# Patient Record
Sex: FEMALE | Race: WHITE
[De-identification: names, ages, dates, MRNs, and addresses within clinical notes are randomized per-mention and may not be internally consistent; named-entity substitution may affect disease eponyms.]

---

## 2019-07-16 ENCOUNTER — HOSPITAL ENCOUNTER (INPATIENT)
Dept: HOSPITAL 75 - LDRP | Age: 24
LOS: 3 days | Discharge: HOME | End: 2019-07-19
Attending: FAMILY MEDICINE | Admitting: FAMILY MEDICINE
Payer: COMMERCIAL

## 2019-07-16 VITALS — WEIGHT: 223.38 LBS | HEIGHT: 61 IN | BODY MASS INDEX: 42.17 KG/M2

## 2019-07-16 VITALS — DIASTOLIC BLOOD PRESSURE: 83 MMHG | SYSTOLIC BLOOD PRESSURE: 128 MMHG

## 2019-07-16 VITALS — SYSTOLIC BLOOD PRESSURE: 155 MMHG | DIASTOLIC BLOOD PRESSURE: 74 MMHG

## 2019-07-16 VITALS — DIASTOLIC BLOOD PRESSURE: 69 MMHG | SYSTOLIC BLOOD PRESSURE: 143 MMHG

## 2019-07-16 VITALS — SYSTOLIC BLOOD PRESSURE: 134 MMHG | DIASTOLIC BLOOD PRESSURE: 79 MMHG

## 2019-07-16 VITALS — DIASTOLIC BLOOD PRESSURE: 79 MMHG | SYSTOLIC BLOOD PRESSURE: 121 MMHG

## 2019-07-16 VITALS — DIASTOLIC BLOOD PRESSURE: 78 MMHG | SYSTOLIC BLOOD PRESSURE: 126 MMHG

## 2019-07-16 VITALS — DIASTOLIC BLOOD PRESSURE: 82 MMHG | SYSTOLIC BLOOD PRESSURE: 136 MMHG

## 2019-07-16 VITALS — DIASTOLIC BLOOD PRESSURE: 84 MMHG | SYSTOLIC BLOOD PRESSURE: 134 MMHG

## 2019-07-16 DIAGNOSIS — Z3A.39: ICD-10-CM

## 2019-07-16 LAB
APTT PPP: YELLOW S
BACTERIA #/AREA URNS HPF: (no result) /HPF
BASOPHILS # BLD AUTO: 0 10^3/UL (ref 0–0.1)
BASOPHILS NFR BLD AUTO: 0 % (ref 0–10)
BILIRUB UR QL STRIP: NEGATIVE
EOSINOPHIL # BLD AUTO: 0.1 10^3/UL (ref 0–0.3)
EOSINOPHIL NFR BLD AUTO: 1 % (ref 0–10)
ERYTHROCYTE [DISTWIDTH] IN BLOOD BY AUTOMATED COUNT: 16.9 % (ref 10–14.5)
FIBRINOGEN PPP-MCNC: (no result) MG/DL
GLUCOSE UR STRIP-MCNC: NEGATIVE MG/DL
HCT VFR BLD CALC: 35 % (ref 35–52)
HGB BLD-MCNC: 11.1 G/DL (ref 11.5–16)
KETONES UR QL STRIP: NEGATIVE
LEUKOCYTE ESTERASE UR QL STRIP: NEGATIVE
LYMPHOCYTES # BLD AUTO: 2.5 X 10^3 (ref 1–4)
LYMPHOCYTES NFR BLD AUTO: 24 % (ref 12–44)
MANUAL DIFFERENTIAL PERFORMED BLD QL: NO
MCH RBC QN AUTO: 25 PG (ref 25–34)
MCHC RBC AUTO-ENTMCNC: 31 G/DL (ref 32–36)
MCV RBC AUTO: 81 FL (ref 80–99)
MONOCYTES # BLD AUTO: 0.8 X 10^3 (ref 0–1)
MONOCYTES NFR BLD AUTO: 7 % (ref 0–12)
NEUTROPHILS # BLD AUTO: 6.9 X 10^3 (ref 1.8–7.8)
NEUTROPHILS NFR BLD AUTO: 67 % (ref 42–75)
NITRITE UR QL STRIP: NEGATIVE
PH UR STRIP: 6 [PH] (ref 5–9)
PLATELET # BLD: 192 10^3/UL (ref 130–400)
PMV BLD AUTO: 13 FL (ref 7.4–10.4)
PROT UR QL STRIP: (no result)
RBC #/AREA URNS HPF: (no result) /HPF
SP GR UR STRIP: 1.02 (ref 1.02–1.02)
UROBILINOGEN UR-MCNC: 1 MG/DL
WBC # BLD AUTO: 10.3 10^3/UL (ref 4.3–11)
WBC #/AREA URNS HPF: (no result) /HPF

## 2019-07-16 PROCEDURE — 81000 URINALYSIS NONAUTO W/SCOPE: CPT

## 2019-07-16 PROCEDURE — 86850 RBC ANTIBODY SCREEN: CPT

## 2019-07-16 PROCEDURE — 36415 COLL VENOUS BLD VENIPUNCTURE: CPT

## 2019-07-16 PROCEDURE — 86900 BLOOD TYPING SEROLOGIC ABO: CPT

## 2019-07-16 PROCEDURE — 86901 BLOOD TYPING SEROLOGIC RH(D): CPT

## 2019-07-16 PROCEDURE — 85025 COMPLETE CBC W/AUTO DIFF WBC: CPT

## 2019-07-16 PROCEDURE — 87088 URINE BACTERIA CULTURE: CPT

## 2019-07-16 RX ADMIN — SODIUM CHLORIDE, SODIUM LACTATE, POTASSIUM CHLORIDE, CALCIUM CHLORIDE, AND DEXTROSE MONOHYDRATE SCH MLS/HR: 600; 310; 30; 20; 5 INJECTION, SOLUTION INTRAVENOUS at 20:42

## 2019-07-16 RX ADMIN — MISOPROSTOL SCH MCG: 100 TABLET ORAL at 20:42

## 2019-07-16 NOTE — NUR
CHAKA IBANEZ presented to unit via AMBULATORY from HOME, accompanied by S/O AND THIS RN 
FOR INDUCTION OF LABOR. CHAKA IBANEZ weighed, gowned, voided, and to bed.  EFHM and 
TOCO applied, VS taken.  CHAKA IBANEZ oriented to bed controls, call light, TV, heat, 
and A/C controls.

## 2019-07-16 NOTE — NUR
Telephone orders obtained from Dr Ross, repeat cytotec in 4 hours if no change in cervix, 
Start pitocin in am per protocol.

## 2019-07-16 NOTE — XMS REPORT
Continuity of Care Document

                             Created on: 2019



Jen Keenan

External Reference #: [id number]

: 1995

Sex: Female



Demographics







                          Address                   [address] 

[place]  [address]

 

                          Home Phone                [phone] x

 

                          Preferred Language        Unknown

 

                          Marital Status            Unknown

 

                          Christian Affiliation     Unknown

 

                          Race                      Unknown

 

                          Ethnic Group              Unknown





Author







                          Organization              Unknown

 

                          Address                   Unknown

 

                          Phone                     [phone]



              



Allergies

      



There is no data.                  



Medications

      



There is no data.                  



Problems

      



There is no data.                  



Procedures

      



There is no data.                  



Results

      





                    Test                Result              Range        









                                        GLUCOSE SRINIVAS 1 HOUR - 19 10:12         









                    GLUCOSE, POSTPRANDIAL/ 1 HOUR              124 mg/dL              See Note:        









                                        CBC - 19 10:12         









                    WHITE BLOOD CELL COUNT              9.1 Thousand/uL              3.8-10.8        

 

                    RED BLOOD CELL COUNT              4.18 Million/uL              3.80-5.10        

 

                    HEMOGLOBIN              11.3 g/dL              11.7-15.5        

 

                    HEMATOCRIT              36.2 %              35.0-45.0        

 

                    MCV                 86.6 fL              80.0-100.0        

 

                    MCH                 27.0 pg              27.0-33.0        

 

                    MCHC                31.2 g/dL              32.0-36.0        

 

                    RDW                 14.0 %              11.0-15.0        

 

                    PLATELET COUNT              203 Thousand/uL              140-400        

 

                    MPV                 12.6 fL              7.5-12.5        

 

                    ABSOLUTE NEUTROPHILS              6916 cells/uL              3271-5157        

 

                    ABSOLUTE LYMPHOCYTES              1638 cells/uL              850-3900        

 

                    ABSOLUTE MONOCYTES              400 cells/uL              200-950        

 

                    ABSOLUTE EOSINOPHILS              118 cells/uL                      

 

                    ABSOLUTE BASOPHILS              27 cells/uL              0-200        

 

                    NEUTROPHILS              76 %                NRG        

 

                    LYMPHOCYTES              18.0 %              NRG        

 

                    MONOCYTES              4.4 %               NRG        

 

                    EOSINOPHILS              1.3 %               NRG        

 

                    BASOPHILS              0.3 %               NRG        









                                        CULTURE, GROUP B STREP WITH SUSCEPTIBILITY - 19 09:58         









                    CULTURE, GROUP B STREP WITH SUSCEPTIBILITY              SEE NOTE               NRG  

      



                    



Encounters

      





                ACCT No.              Visit Date/Time              Discharge              Status      

                Pt. Type              Provider              Facility              Loc./Unit      

                                        Complaint        

 

             [id number]              07/15/2019 10:45:00                             ACT              Outpatient

                    AND[facility]OW[facility] LAC                                  [facility]     

                                                 

 

             [id number]              2019 09:30:00                                            Document

 Registration                                                                       

 

             [id number]              2019 09:15:00                                            Document

 Registration

## 2019-07-16 NOTE — NUR
Dr Roque called with report after attempting to call call Dr Ross with no answer. Dr roque 
aware of situation and orders to hold cytotec and monitor pt until am.

## 2019-07-17 VITALS — DIASTOLIC BLOOD PRESSURE: 77 MMHG | SYSTOLIC BLOOD PRESSURE: 132 MMHG

## 2019-07-17 VITALS — DIASTOLIC BLOOD PRESSURE: 72 MMHG | SYSTOLIC BLOOD PRESSURE: 130 MMHG

## 2019-07-17 VITALS — DIASTOLIC BLOOD PRESSURE: 75 MMHG | SYSTOLIC BLOOD PRESSURE: 120 MMHG

## 2019-07-17 VITALS — SYSTOLIC BLOOD PRESSURE: 144 MMHG | DIASTOLIC BLOOD PRESSURE: 69 MMHG

## 2019-07-17 VITALS — SYSTOLIC BLOOD PRESSURE: 121 MMHG | DIASTOLIC BLOOD PRESSURE: 68 MMHG

## 2019-07-17 VITALS — SYSTOLIC BLOOD PRESSURE: 130 MMHG | DIASTOLIC BLOOD PRESSURE: 77 MMHG

## 2019-07-17 VITALS — SYSTOLIC BLOOD PRESSURE: 119 MMHG | DIASTOLIC BLOOD PRESSURE: 73 MMHG

## 2019-07-17 VITALS — DIASTOLIC BLOOD PRESSURE: 78 MMHG | SYSTOLIC BLOOD PRESSURE: 126 MMHG

## 2019-07-17 VITALS — DIASTOLIC BLOOD PRESSURE: 72 MMHG | SYSTOLIC BLOOD PRESSURE: 144 MMHG

## 2019-07-17 VITALS — SYSTOLIC BLOOD PRESSURE: 120 MMHG | DIASTOLIC BLOOD PRESSURE: 84 MMHG

## 2019-07-17 VITALS — DIASTOLIC BLOOD PRESSURE: 64 MMHG | SYSTOLIC BLOOD PRESSURE: 106 MMHG

## 2019-07-17 VITALS — DIASTOLIC BLOOD PRESSURE: 77 MMHG | SYSTOLIC BLOOD PRESSURE: 125 MMHG

## 2019-07-17 VITALS — DIASTOLIC BLOOD PRESSURE: 59 MMHG | SYSTOLIC BLOOD PRESSURE: 126 MMHG

## 2019-07-17 VITALS — SYSTOLIC BLOOD PRESSURE: 139 MMHG | DIASTOLIC BLOOD PRESSURE: 75 MMHG

## 2019-07-17 VITALS — DIASTOLIC BLOOD PRESSURE: 78 MMHG | SYSTOLIC BLOOD PRESSURE: 128 MMHG

## 2019-07-17 VITALS — DIASTOLIC BLOOD PRESSURE: 76 MMHG | SYSTOLIC BLOOD PRESSURE: 120 MMHG

## 2019-07-17 VITALS — DIASTOLIC BLOOD PRESSURE: 75 MMHG | SYSTOLIC BLOOD PRESSURE: 122 MMHG

## 2019-07-17 VITALS — SYSTOLIC BLOOD PRESSURE: 114 MMHG | DIASTOLIC BLOOD PRESSURE: 76 MMHG

## 2019-07-17 VITALS — SYSTOLIC BLOOD PRESSURE: 120 MMHG | DIASTOLIC BLOOD PRESSURE: 71 MMHG

## 2019-07-17 VITALS — SYSTOLIC BLOOD PRESSURE: 117 MMHG | DIASTOLIC BLOOD PRESSURE: 66 MMHG

## 2019-07-17 VITALS — SYSTOLIC BLOOD PRESSURE: 120 MMHG | DIASTOLIC BLOOD PRESSURE: 77 MMHG

## 2019-07-17 VITALS — SYSTOLIC BLOOD PRESSURE: 126 MMHG | DIASTOLIC BLOOD PRESSURE: 74 MMHG

## 2019-07-17 VITALS — SYSTOLIC BLOOD PRESSURE: 124 MMHG | DIASTOLIC BLOOD PRESSURE: 68 MMHG

## 2019-07-17 VITALS — DIASTOLIC BLOOD PRESSURE: 71 MMHG | SYSTOLIC BLOOD PRESSURE: 109 MMHG

## 2019-07-17 VITALS — DIASTOLIC BLOOD PRESSURE: 71 MMHG | SYSTOLIC BLOOD PRESSURE: 127 MMHG

## 2019-07-17 VITALS — SYSTOLIC BLOOD PRESSURE: 126 MMHG | DIASTOLIC BLOOD PRESSURE: 70 MMHG

## 2019-07-17 VITALS — SYSTOLIC BLOOD PRESSURE: 137 MMHG | DIASTOLIC BLOOD PRESSURE: 73 MMHG

## 2019-07-17 VITALS — DIASTOLIC BLOOD PRESSURE: 56 MMHG | SYSTOLIC BLOOD PRESSURE: 114 MMHG

## 2019-07-17 VITALS — DIASTOLIC BLOOD PRESSURE: 79 MMHG | SYSTOLIC BLOOD PRESSURE: 134 MMHG

## 2019-07-17 VITALS — SYSTOLIC BLOOD PRESSURE: 116 MMHG | DIASTOLIC BLOOD PRESSURE: 71 MMHG

## 2019-07-17 VITALS — DIASTOLIC BLOOD PRESSURE: 82 MMHG | SYSTOLIC BLOOD PRESSURE: 125 MMHG

## 2019-07-17 VITALS — DIASTOLIC BLOOD PRESSURE: 75 MMHG | SYSTOLIC BLOOD PRESSURE: 139 MMHG

## 2019-07-17 VITALS — DIASTOLIC BLOOD PRESSURE: 73 MMHG | SYSTOLIC BLOOD PRESSURE: 128 MMHG

## 2019-07-17 VITALS — DIASTOLIC BLOOD PRESSURE: 78 MMHG | SYSTOLIC BLOOD PRESSURE: 132 MMHG

## 2019-07-17 VITALS — SYSTOLIC BLOOD PRESSURE: 130 MMHG | DIASTOLIC BLOOD PRESSURE: 74 MMHG

## 2019-07-17 VITALS — SYSTOLIC BLOOD PRESSURE: 126 MMHG | DIASTOLIC BLOOD PRESSURE: 71 MMHG

## 2019-07-17 VITALS — SYSTOLIC BLOOD PRESSURE: 169 MMHG | DIASTOLIC BLOOD PRESSURE: 67 MMHG

## 2019-07-17 VITALS — DIASTOLIC BLOOD PRESSURE: 77 MMHG | SYSTOLIC BLOOD PRESSURE: 121 MMHG

## 2019-07-17 VITALS — SYSTOLIC BLOOD PRESSURE: 117 MMHG | DIASTOLIC BLOOD PRESSURE: 76 MMHG

## 2019-07-17 VITALS — DIASTOLIC BLOOD PRESSURE: 77 MMHG | SYSTOLIC BLOOD PRESSURE: 123 MMHG

## 2019-07-17 VITALS — DIASTOLIC BLOOD PRESSURE: 66 MMHG | SYSTOLIC BLOOD PRESSURE: 110 MMHG

## 2019-07-17 VITALS — SYSTOLIC BLOOD PRESSURE: 122 MMHG | DIASTOLIC BLOOD PRESSURE: 77 MMHG

## 2019-07-17 VITALS — SYSTOLIC BLOOD PRESSURE: 126 MMHG | DIASTOLIC BLOOD PRESSURE: 59 MMHG

## 2019-07-17 VITALS — DIASTOLIC BLOOD PRESSURE: 81 MMHG | SYSTOLIC BLOOD PRESSURE: 129 MMHG

## 2019-07-17 PROCEDURE — 3E033VJ INTRODUCTION OF OTHER HORMONE INTO PERIPHERAL VEIN, PERCUTANEOUS APPROACH: ICD-10-PCS | Performed by: FAMILY MEDICINE

## 2019-07-17 RX ADMIN — IBUPROFEN SCH MG: 600 TABLET ORAL at 15:43

## 2019-07-17 RX ADMIN — IBUPROFEN SCH MG: 600 TABLET ORAL at 22:00

## 2019-07-17 RX ADMIN — DOCUSATE SODIUM SCH MG: 100 CAPSULE ORAL at 22:00

## 2019-07-17 RX ADMIN — MISOPROSTOL SCH MCG: 100 TABLET ORAL at 05:48

## 2019-07-17 RX ADMIN — MISOPROSTOL SCH MCG: 100 TABLET ORAL at 00:30

## 2019-07-17 RX ADMIN — SODIUM CHLORIDE, SODIUM LACTATE, POTASSIUM CHLORIDE, CALCIUM CHLORIDE, AND DEXTROSE MONOHYDRATE SCH MLS/HR: 600; 310; 30; 20; 5 INJECTION, SOLUTION INTRAVENOUS at 05:48

## 2019-07-17 NOTE — OB LABOR & DELIVERY RECORD
Vag Delivery Note


Vag Delivery Note


Date of Delivery: 19 





Preoperative Diagnosis: Jen Keenan  is a (23  /Para  2 / 2,

Gestational Age (wks)39with [1 day]





Postoperative Diagnosis: Same


Surgeon: LISANDRO DAVE 





Assistant: [none]





Anesthesia: []





Delivery Type: []





Findings: []


Viable [female] infant, apgars [7/7], weight [5 pounds 13 ounces]


Lacerations:


Intact placenta with 3 vessel cord. No nuchal cord, body cord or shoulder 

dystocia








Estimated Blood Loss: [150] ml





Complications: None





Condition: Stable





Description of Procedure:





The patient is a 23 year old female who presented [for induction of labor]. She 

was admitted and informed consent was obtained. Her labor course was remarkable 

for [decelerations with cytotec] She progressed to complete dilatation and began

to push. 





She was then set up for delivery. The infant's head was delivered atraumatically

in the [OA] position. The shoulders and remainder of the infant's body were then

 delivered without difficulty. Upon delivery, the head was held below the level 

of the perineum and the mouth and nares were bulb suctioned. The cord was doubly

 clamped and cut after 60 seconds and infant was placed on maternal abdomen. An 

intact placenta with 3-vessel cord delivered via Sy and there was found to 

be minimal bleeding.~ Vigorous fundal massage was performed and the fundus was 

found to be firm. IV oxytocin was given. Examination of the vagina and perineum 

revealed no lacerations. Following the repair, sponge, instrument and needle 

counts were correct. Mom and baby were both in stable condition in the labor 

suite.





Vitals - Labs


Vital Signs - I&O





Vital Signs








  Date Time  Temp Pulse Resp B/P (MAP) Pulse Ox O2 Delivery O2 Flow Rate FiO2


 


19 11:15  61 18 122/75 (91) 98 Room Air  


 


19 11:00  59 18 121/68 (85) 97 Room Air  


 


19 10:45  55 18 119/73 (88) 97 Room Air  


 


19 10:30 97.9 62 18 122/77 (92) 99 Room Air  


 


19 10:15  64 18 128/73 (91) 98 Room Air  


 


19 10:06      Room Air  


 


19 10:00  61 18 125/77 (93) 100 Non Rebreather 10.00 


 


19 09:45  60 18 130/74 (92) 100 Room Air  


 


19 09:30  56 18 117/66 (83) 100 Room Air 10.00 


 


19 09:15  66 18 109/71 (84) 100 Non Rebreather  


 


19 09:00  67 18 128/78 (95) 100 Non Rebreather 10.00 


 


19 08:57     96 Non Rebreather 10.00 


 


19 08:45  57 18 121/77 (92) 96 Room Air  


 


19 08:40  57 18 120/77 (91) 98 Room Air  


 


19 08:35  52 18 132/77 (95) 98 Room Air  


 


19 08:30  67 18 110/66 (81) 96 Room Air  


 


19 08:25  57 18 117/76 (90) 98 Room Air  


 


19 08:20  62 18 120/75 (90) 99 Room Air  


 


19 08:15  62 18 120/76 (91) 96 Room Air  


 


19 08:10  64 18 123/77 (92) 99 Room Air  


 


19 08:05  76 18 126/70 (88) 99 Room Air  


 


19 08:00  65 18 120/84 (96)  Room Air  


 


19 07:55  73 18 132/78 (96) 99 Room Air  


 


19 07:50  60 18 139/75 (96) 99 Room Air  


 


19 07:30  60 18 114/56 (75)  Room Air  


 


19 07:15 96.8 67 18 126/59 (81)  Room Air  


 


19 06:45  59 18 127/71 (89)    


 


19 06:30  67 18 120/71 (87)    


 


19 06:15  71 18 130/72 (91)    


 


19 06:00 97.1 73 18 125/82 (96)  Room Air  


 


19 02:07 97.5 87 18 137/73 (94)  Room Air  


 


19 23:41 97.4 77 18 134/84 (101) 99 Room Air  


 


19 22:05  63 18 143/69 (93)    


 


19 21:50  75 18 128/83 (98)    


 


19 21:35  71 18 126/78 (94)    


 


19 21:20  74 18 121/79 (93)    


 


19 21:10  77 18 155/74 (101)    


 


19 20:50 98.0 66 18 136/82 (100)  Room Air  


 


19 19:10 98.3 88 18 134/79 (97)  Room Air  














I & O 


 


 19





 07:00


 


Intake Total 2000 ml


 


Balance 2000 ml











Labs


Laboratory Tests


19 18:50: 


Urine Color YELLOW, Urine Clarity SL CLOUDY, Urine pH 6, Urine Specific Gravity 

1.025H, Urine Protein 2+H, Urine Glucose (UA) NEGATIVE, Urine Ketones NEGATIVE, 

Urine Nitrite NEGATIVE, Urine Bilirubin NEGATIVE, Urine Urobilinogen 1, Urine 

Leukocyte Esterase NEGATIVE, Urine RBC (Auto) 1+H, Urine RBC NONE, Urine WBC 0-

2, Urine Squamous Epithelial Cells 10-25H, Urine Crystals NONE, Urine Bacteria 

MODERATEH, Urine Casts NONE, Urine Mucus MODERATEH, Urine Culture Indicated YES


19 19:40: 


White Blood Count 10.3, Red Blood Count 4.40, Hemoglobin 11.1L, Hematocrit 35, 

Mean Corpuscular Volume 81, Mean Corpuscular Hemoglobin 25, Mean Corpuscular 

Hemoglobin Concent 31L, Red Cell Distribution Width 16.9H, Platelet Count 192, 

Mean Platelet Volume 13.0H, Neutrophils (%) (Auto) 67, Lymphocytes (%) (Auto) 

24, Monocytes (%) (Auto) 7, Eosinophils (%) (Auto) 1, Basophils (%) (Auto) 0, 

Neutrophils # (Auto) 6.9, Lymphocytes # (Auto) 2.5, Monocytes # (Auto) 0.8, 

Eosinophils # (Auto) 0.1, Basophils # (Auto) 0.0











LISANDRO DAVE MD             2019 13:20

## 2019-07-17 NOTE — NUR
FFu/1.  lt rubra noted. no clots expressed. chaitanya-care offered. v-pad and panties in place. 

pt transferred to room 309 via w/c with this RN, infant and  @ side. pt tolerated 
well.  call light within reach.

## 2019-07-17 NOTE — NUR
assisted up to BR with staff @ side. +void noted. chaitanya-care instructions given, returned 
demonstration.

## 2019-07-17 NOTE — NUR
LLUVIA Weber here for epidural placement. Procedure explained, consent reviewed and 
signed by anesthesia.  Questions answered to patient's satisfaction. Time out taken to 
verify correct patient/procedure. 

0747- Patient up to side of bed, assisted into sitting position.  Betadine prep done x3 and 
sterile drape applied.  

0754- Local done, see anesthesia record.  

0758- Test dose given, see anesthesia record for drug and dosage. 

0755- Epidural catheter secured in place.  Epidural placement complete. 

0802- Assisted back into bed, monitors adjusted.  Epidural dosed, see anesthesia record.  

0808- Epidural of Sufenta/Bupvicaine @12cc/hr stated per pump.  Patient tolerated procedure 
well.

## 2019-07-17 NOTE — LABOR PROGRESS NOTE
Labor Progress Note


Labor Progress Note


Date Seen by Provider:  2019


Time Seen by Provider:  09:15


Subjective:


Pt denies complaints.





Objective:


(Can we insert 24 hour vitals here?)


Cervical exam: 2-3


Consistency: soft


Position: mid


Presentation: vtx, confirmed by US


Fetal heart tones: 130-140 beats per minute, average variability, accels noted 

currently-  reactive








Assessment/Plan:


Jen Keenan  is a (23  /Para   / ,Gestational Age (wks)39 here for 

IOL.


Pt had cytotec placed last night for cervical ripening, began having late 

decelerations that did not respond to O2, position change and IVF.  Terb given 

with resolution of late decels.  Had reactive NST this am prior to initiating 

Pitocin.  Pit started and advanced to 4, pt began having mild late onset decels 

with decreased variability.  Pit stopped.  I performed SVE and noted to be 

dilated to 2-3 (change from prior check of 1.5).  FSE placed.  FHT reactive 

during exam with acceleration and good BTBV.  Will monitor for 15-20 min then 

restart Pit and monitor closely.


Anesthesia: epidural


Discussed with patient and family the concern for baby not tolerating 

contractions.  Discussed that if baby resumes having late decelerations with 

contractions/pitocin may need to consider  delivery.  Questions 

answered.


Dr. Ross updated.





Vitals - Labs


Vital Signs - I&O





Vital Signs








  Date Time  Temp Pulse Resp B/P (MAP) Pulse Ox O2 Delivery O2 Flow Rate FiO2


 


19 06:45  59 18 127/71 (89)    


 


19 06:30  67 18 120/71 (87)    


 


19 06:15  71 18 130/72 (91)    


 


19 06:00 97.1 73 18 125/82 (96)  Room Air  


 


19 02:07 97.5 87 18 137/73 (94)  Room Air  


 


19 23:41 97.4 77 18 134/84 (101) 99 Room Air  


 


19 22:05  63 18 143/69 (93)    


 


19 21:50  75 18 128/83 (98)    


 


19 21:35  71 18 126/78 (94)    


 


19 21:20  74 18 121/79 (93)    


 


19 21:10  77 18 155/74 (101)    


 


19 20:50 98.0 66 18 136/82 (100)  Room Air  


 


19 19:10 98.3 88 18 134/79 (97)  Room Air  














I & O 


 


 19





 07:00


 


Intake Total 2000 ml


 


Balance 2000 ml











Labs


Laboratory Tests


19 18:50: 


Urine Color YELLOW, Urine Clarity SL CLOUDY, Urine pH 6, Urine Specific Gravity 

1.025H, Urine Protein 2+H, Urine Glucose (UA) NEGATIVE, Urine Ketones NEGATIVE, 

Urine Nitrite NEGATIVE, Urine Bilirubin NEGATIVE, Urine Urobilinogen 1, Urine 

Leukocyte Esterase NEGATIVE, Urine RBC (Auto) 1+H, Urine RBC NONE, Urine WBC 0-

2, Urine Squamous Epithelial Cells 10-25H, Urine Crystals NONE, Urine Bacteria 

MODERATEH, Urine Casts NONE, Urine Mucus MODERATEH, Urine Culture Indicated YES


19 19:40: 


White Blood Count 10.3, Red Blood Count 4.40, Hemoglobin 11.1L, Hematocrit 35, 

Mean Corpuscular Volume 81, Mean Corpuscular Hemoglobin 25, Mean Corpuscular 

Hemoglobin Concent 31L, Red Cell Distribution Width 16.9H, Platelet Count 192, 

Mean Platelet Volume 13.0H, Neutrophils (%) (Auto) 67, Lymphocytes (%) (Auto) 

24, Monocytes (%) (Auto) 7, Eosinophils (%) (Auto) 1, Basophils (%) (Auto) 0, 

Neutrophils # (Auto) 6.9, Lymphocytes # (Auto) 2.5, Monocytes # (Auto) 0.8, 

Eosinophils # (Auto) 0.1, Basophils # (Auto) 0.0











VIRGINIA MCKEON DO                2019 09:45

## 2019-07-17 NOTE — HISTORY & PHYSICAL-OB
OB - Chief Complaint & HPI


Date/Time


Date of Admission:


Date of Admission:  2019 at 18:41


Date seen by a Provider:  2019


Time Seen by a Provider:  12:50





Chief Complaint/History


OB-Reason for Admission/Chief:  Induction of Labor


Hx :  2


Hx Para:  2


Expected Date of Delivery:  2019


Gestational Age in Weeks:  39


Gestational Age in Days:  1


Indication for induction:  maternal discomfort


Admission Nurse Assessment Rev:  Yes





Allergies and Home Medications


Allergies


Coded Allergies:  


     No Known Drug Allergies (Unverified , 19)





Home Medications


No Active Prescriptions or Reported Meds





Patient Home Medication List


Home Medication List Reviewed:  Yes





OB - History


Hx of Present Pregnancy


Prenatal Care:  Yes


Ultrasounds:  Normal mid trimester US


Obstetrical Complications:  None


Medical Complications:  None





Patient Past Medical History





previously healthy





Social History/Family History


Alcohol Use:  Denies Use


Recreational Drug Use:  No





OB - Admission Exam


Physical Exam


Vitals:





Vital Signs








 19





 10:00 10:30 11:15


 


Temp  97.9 


 


Pulse   61


 


Resp   18


 


B/P (MAP)   122/75 (91)


 


Pulse Ox   98


 


O2 Delivery   Room Air


 


O2 Flow Rate 10.00  








HEENT:  NCAT


Heart:  Rhythm Normal


Lungs:  Clear


Abdomen:  Gravid


Extremities:  Normal


Reflexes:  Normal


Cervical Dilatation:  10cm


Effacement:  100%


Station:  +2


Membranes:  Ruptured


Amniotic Fluid:  Clear


Fetal Heart Rate:  140's


Accelerations:  Accelerations Present


Decelerations:  No Decelerations


Short Term Variability:  Present


Long Term Variability:  Average (6-25)


Contractions on Admission:  < 5 Minutes Apart


Labs





Laboratory Tests








Test


 19


18:50 19


19:40 Range/Units


 


 


Urine Color YELLOW    


 


Urine Clarity SL CLOUDY    


 


Urine pH 6   5-9  


 


Urine Specific Gravity 1.025 H  1.016-1.022  


 


Urine Protein 2+ H  NEGATIVE  


 


Urine Glucose (UA) NEGATIVE   NEGATIVE  


 


Urine Ketones NEGATIVE   NEGATIVE  


 


Urine Nitrite NEGATIVE   NEGATIVE  


 


Urine Bilirubin NEGATIVE   NEGATIVE  


 


Urine Urobilinogen 1   NORMAL  MG/DL


 


Urine Leukocyte Esterase NEGATIVE   NEGATIVE  


 


Urine RBC (Auto) 1+ H  NEGATIVE  


 


Urine RBC NONE    /HPF


 


Urine WBC 0-2    /HPF


 


Urine Squamous Epithelial


Cells 10-25 H


 


  /HPF





 


Urine Crystals NONE    /LPF


 


Urine Bacteria MODERATE H   /HPF


 


Urine Casts NONE    /LPF


 


Urine Mucus MODERATE H   /LPF


 


Urine Culture Indicated YES    


 


White Blood Count


 


 10.3 


 4.3-11.0


10^3/uL


 


Red Blood Count


 


 4.40 


 4.35-5.85


10^6/uL


 


Hemoglobin  11.1 L 11.5-16.0  G/DL


 


Hematocrit  35  35-52  %


 


Mean Corpuscular Volume  81  80-99  FL


 


Mean Corpuscular Hemoglobin  25  25-34  PG


 


Mean Corpuscular Hemoglobin


Concent 


 31 L


 32-36  G/DL





 


Red Cell Distribution Width  16.9 H 10.0-14.5  %


 


Platelet Count


 


 192 


 130-400


10^3/uL


 


Mean Platelet Volume  13.0 H 7.4-10.4  FL


 


Neutrophils (%) (Auto)  67  42-75  %


 


Lymphocytes (%) (Auto)  24  12-44  %


 


Monocytes (%) (Auto)  7  0-12  %


 


Eosinophils (%) (Auto)  1  0-10  %


 


Basophils (%) (Auto)  0  0-10  %


 


Neutrophils # (Auto)  6.9  1.8-7.8  X 10^3


 


Lymphocytes # (Auto)  2.5  1.0-4.0  X 10^3


 


Monocytes # (Auto)  0.8  0.0-1.0  X 10^3


 


Eosinophils # (Auto)


 


 0.1 


 0.0-0.3


10^3/uL


 


Basophils # (Auto)


 


 0.0 


 0.0-0.1


10^3/uL











OB - Assessment/Plan/Diagnosis


Assessment


Assessment:  induction of labor


Admission Dx


Induction of labor at 39 1/7 wga


Admission Status:  Inpatient Order (span 2 midnights)


Reason for Inpatient Admission:  


Induction of labor.





Plan


Plan:  Induction


Induction Method:  per Pitocin Protocol











LISANDRO DAVE MD             2019 13:18

## 2019-07-18 VITALS — SYSTOLIC BLOOD PRESSURE: 137 MMHG | DIASTOLIC BLOOD PRESSURE: 89 MMHG

## 2019-07-18 VITALS — SYSTOLIC BLOOD PRESSURE: 132 MMHG | DIASTOLIC BLOOD PRESSURE: 81 MMHG

## 2019-07-18 VITALS — DIASTOLIC BLOOD PRESSURE: 66 MMHG | SYSTOLIC BLOOD PRESSURE: 139 MMHG

## 2019-07-18 VITALS — SYSTOLIC BLOOD PRESSURE: 124 MMHG | DIASTOLIC BLOOD PRESSURE: 80 MMHG

## 2019-07-18 VITALS — SYSTOLIC BLOOD PRESSURE: 143 MMHG | DIASTOLIC BLOOD PRESSURE: 90 MMHG

## 2019-07-18 LAB
BASOPHILS # BLD AUTO: 0 10^3/UL (ref 0–0.1)
BASOPHILS NFR BLD AUTO: 0 % (ref 0–10)
EOSINOPHIL # BLD AUTO: 0.1 10^3/UL (ref 0–0.3)
EOSINOPHIL NFR BLD AUTO: 1 % (ref 0–10)
ERYTHROCYTE [DISTWIDTH] IN BLOOD BY AUTOMATED COUNT: 17.4 % (ref 10–14.5)
HCT VFR BLD CALC: 32 % (ref 35–52)
HGB BLD-MCNC: 9.9 G/DL (ref 11.5–16)
LYMPHOCYTES # BLD AUTO: 2.4 X 10^3 (ref 1–4)
LYMPHOCYTES NFR BLD AUTO: 26 % (ref 12–44)
MANUAL DIFFERENTIAL PERFORMED BLD QL: NO
MCH RBC QN AUTO: 25 PG (ref 25–34)
MCHC RBC AUTO-ENTMCNC: 31 G/DL (ref 32–36)
MCV RBC AUTO: 82 FL (ref 80–99)
MONOCYTES # BLD AUTO: 0.5 X 10^3 (ref 0–1)
MONOCYTES NFR BLD AUTO: 6 % (ref 0–12)
NEUTROPHILS # BLD AUTO: 6 X 10^3 (ref 1.8–7.8)
NEUTROPHILS NFR BLD AUTO: 66 % (ref 42–75)
PLATELET # BLD: 164 10^3/UL (ref 130–400)
PMV BLD AUTO: 12.7 FL (ref 7.4–10.4)
WBC # BLD AUTO: 9.1 10^3/UL (ref 4.3–11)

## 2019-07-18 RX ADMIN — IBUPROFEN SCH MG: 600 TABLET ORAL at 09:35

## 2019-07-18 RX ADMIN — DOCUSATE SODIUM SCH MG: 100 CAPSULE ORAL at 20:51

## 2019-07-18 RX ADMIN — IBUPROFEN SCH MG: 600 TABLET ORAL at 04:11

## 2019-07-18 RX ADMIN — DOCUSATE SODIUM SCH MG: 100 CAPSULE ORAL at 09:35

## 2019-07-18 RX ADMIN — IBUPROFEN SCH MG: 600 TABLET ORAL at 20:51

## 2019-07-18 RX ADMIN — IBUPROFEN SCH MG: 600 TABLET ORAL at 14:46

## 2019-07-18 NOTE — NUR
initial shift assessment completed, see interventions for further. scheduled medications 
given, see eMar for further.

## 2019-07-18 NOTE — PROGRESS NOTE
Subjective


Subjective/Events-last exam


Doing well.  Lochia decreased.  No pain.


Review of Systems


Date Seen by Provider:  2019


Time Seen by Provider:  09:44





Objective


Exam


Last Set of Vital Signs





Vital Signs








  Date Time  Temp Pulse Resp B/P (MAP) Pulse Ox O2 Delivery O2 Flow Rate FiO2


 


19 04:15 96.9 68 18 124/80 (95) 99 Room Air  


 


19 12:45       10.00 





Capillary Refill :


I&O











Intake and Output 


 


 19





 00:00


 


Intake Total 1500 ml


 


Balance 1500 ml


 


 


 


Intake IV Total 1500 ml








General:  Alert, Oriented X3, Cooperative


Psych/Mental Status:  Mood NL





Results/Procedures


Lab


Laboratory Tests


19 06:20: 


White Blood Count 9.1, Red Blood Count 3.92L, Hemoglobin 9.9L, Hematocrit 32L, 

Mean Corpuscular Volume 82, Mean Corpuscular Hemoglobin 25, Mean Corpuscular 

Hemoglobin Concent 31L, Red Cell Distribution Width 17.4H, Platelet Count 164, 

Mean Platelet Volume 12.7H, Neutrophils (%) (Auto) 66, Lymphocytes (%) (Auto) 

26, Monocytes (%) (Auto) 6, Eosinophils (%) (Auto) 1, Basophils (%) (Auto) 0, 

Neutrophils # (Auto) 6.0, Lymphocytes # (Auto) 2.4, Monocytes # (Auto) 0.5, 

Eosinophils # (Auto) 0.1, Basophils # (Auto) 0.0





Microbiology


19 Urine Culture - Final, Complete


          3 or more isolates





Assessment/Plan


Assessment/Plan


Assessment & Plan


PPD#1 s/p 


plan to [place] home tomorrow.





Clinical Quality Measures


DVT/VTE Risk/Contraindication:


Risk Factor Score Per Nursin


RFS Level Per Nursing on Admit:  1=Low/No VTE PPX











VIRGINIA MCKEON DO                2019 09:46

## 2019-07-18 NOTE — ANESTHESIA-REGIONAL POST-OP
Regional


Patient Condition


Mental Status:  Alert, Oriented x3


Circulation:  Same as Pre-Op


Headache:  Absent


Sensation:  Full Recovery


Motor Block:  Absent





Post Op Complications


Complications


None





Follow Up Care/Instructions


Patient Instructions


None needed.





Anesthesia/Patient Condition


Patient is doing well, no complaints, stable vital signs, no apparent adverse 

anesthesia problems.   


No complications reported per nursing.











MESHA TERRY CRNA          Jul 18, 2019 14:06

## 2019-07-19 VITALS — SYSTOLIC BLOOD PRESSURE: 120 MMHG | DIASTOLIC BLOOD PRESSURE: 80 MMHG

## 2019-07-19 VITALS — SYSTOLIC BLOOD PRESSURE: 138 MMHG | DIASTOLIC BLOOD PRESSURE: 84 MMHG

## 2019-07-19 RX ADMIN — DOCUSATE SODIUM SCH MG: 100 CAPSULE ORAL at 07:42

## 2019-07-19 RX ADMIN — IBUPROFEN SCH MG: 600 TABLET ORAL at 02:22

## 2019-07-19 RX ADMIN — IBUPROFEN SCH MG: 600 TABLET ORAL at 07:42

## 2019-07-19 NOTE — DISCHARGE INSTRUCTIONS
Discharge Inst-[facility]


Follow Up/Instructions


Goal/Follow Up:  


Follow up with Dr. Ross in 6 weeks





Activity


Activity:  Activity as Tolerated


Nothing Inside Vagina:  No Douching, No [place], No Tampons





Diet


Discharge Diet:  No Restrictions


Symptoms to Report to :  Bleeding Excessive, Fever Over 101 Degrees F, 

Vaginal Bleeding Increase, Vaginal Discharge Foul, Questions/Concerns, Shortness

of Breath


For Any Problems or Questions:  Contact Your Physician











VIRGINIA MCKEON DO                Jul 19, 2019 09:52